# Patient Record
Sex: MALE
[De-identification: names, ages, dates, MRNs, and addresses within clinical notes are randomized per-mention and may not be internally consistent; named-entity substitution may affect disease eponyms.]

---

## 2022-12-25 ENCOUNTER — NURSE TRIAGE (OUTPATIENT)
Dept: OTHER | Facility: CLINIC | Age: 52
End: 2022-12-25

## 2022-12-25 ENCOUNTER — TELEPHONE (OUTPATIENT)
Dept: OTHER | Facility: CLINIC | Age: 52
End: 2022-12-25

## 2022-12-25 NOTE — TELEPHONE ENCOUNTER
Location of patient: [place]    Subjective: Caller states quarter size lump on front of thigh, some pain to touch, concerned for spreading redness    Current Symptoms: redness and swelling to front of thigh    Associated Symptoms: NA    Pain Severity: 0/10; N/A; none    Temperature: denies fever by unknown method    What has been tried: wound care, neosporin    Recommended disposition: See PCP or HCP within 24hrs    Care advice provided, patient verbalizes understanding; denies any other questions or concerns.     Outcome: Patient/caller agrees to follow-up with PCP or [facility] if open  in the morning     Reason for Disposition   Looks like a boil, infected sore, deep ulcer or other infected rash    Protocols used: Skin Lump or Localized Swelling-ADULT-